# Patient Record
(demographics unavailable — no encounter records)

---

## 2025-01-09 NOTE — REVIEW OF SYSTEMS
[Fever] : no fever [Chills] : no chills [Blurry Vision] : no blurred vision [Hearing Loss] : no hearing loss [SOB] : no shortness of breath [Dyspnea on exertion] : not dyspnea during exertion [Wheezing] : no wheezing [Nocturia] : no nocturia [Joint Pain] : no joint pain [Rash] : no rash [Dizziness] : no dizziness [Confusion] : no confusion was observed [Easy Bleeding] : no tendency for easy bleeding

## 2025-01-09 NOTE — PHYSICAL EXAM
[Well Developed] : well developed [Well Nourished] : well nourished [Normal Conjunctiva] : normal conjunctiva [Normal Venous Pressure] : normal venous pressure [No Carotid Bruit] : no carotid bruit [Normal Rate] : normal [Rhythm Regular] : regular [Normal S1] : normal S1 [Normal S2] : normal S2 [I] : a grade 1 [Clear Lung Fields] : clear lung fields [Soft] : abdomen soft [Normal Gait] : normal gait [No Edema] : no edema [No Rash] : no rash [Moves all extremities] : moves all extremities [Alert and Oriented] : alert and oriented [S3] : no S3 [S4] : no S4

## 2025-01-09 NOTE — ASSESSMENT
[FreeTextEntry1] : 75 yo m hx htn HLD. He was on treadmill got chest pain 12/14/24. Came er. No mi. Lexiscan 12/15/24 no ischemia. Since no further pain. He uses treadmill 7 days a week. He 35- 38 minutes. Hospital records reviewed. Ekg reviewed. BP at home better. Meds reviewed with patient. PMD checks blood. Echo to be done at Dr Ball office

## 2025-01-09 NOTE — HISTORY OF PRESENT ILLNESS
[FreeTextEntry1] : 75 yo m hx htn HLD. He was on treadmill got chest pain 12/14/24. Came er. No mi. Lexiscan 12/15/24 no ischemia. Since no further pain. He uses treadmill 7 days a week. He 35- 38 minutes.